# Patient Record
(demographics unavailable — no encounter records)

---

## 2024-11-18 NOTE — DISCUSSION/SUMMARY
[FreeTextEntry1] : Discussed management options for recurrent symptoms including getting a urine culture done to exclude urinary tract infection, consider use of organic coconut oil or vaginal estradiol cream if develops symptoms again.  She verbalized understanding. Advised her to follow-up with her GYN for routine GYN care. Follow-up in urogyn clinic as needed

## 2024-11-18 NOTE — HISTORY OF PRESENT ILLNESS
[FreeTextEntry1] : Patient is a 74-year-old multiparous breast cancer survivor was initially seen in July 2024 with concerns of intermittent pelvic pressure for past 7 months.  During that visit, on exam, she was noted to have evidence of atrophic vaginitis, negative cough stress test and normal postvoid residual. Potential contributing factors include chronic constipation and straining.  Patient was offered a trial of vaginal estradiol cream.  Urine culture from that visit returned negative.  Patient presents today for a scheduled follow-up.  She states that her initial symptoms of pelvic pressure etc. was resolved few weeks after the visit in July.  She did not use vaginal estradiol cream.  She did not need reports organic coconut oil either.  At this time, she reports feeling well in terms of her urogyn symptoms.

## 2025-03-18 NOTE — PHYSICAL EXAM
[Well Developed] : well developed [Well Nourished] : well nourished [No Acute Distress] : no acute distress [Normal Conjunctiva] : normal conjunctiva [Normal Venous Pressure] : normal venous pressure [Normal S1, S2] : normal S1, S2 [No Murmur] : no murmur [No Rub] : no rub [Clear Lung Fields] : clear lung fields [Good Air Entry] : good air entry [No Respiratory Distress] : no respiratory distress  [Soft] : abdomen soft [Non Tender] : non-tender [Normal Gait] : normal gait [No Edema] : no edema [No Rash] : no rash [Moves all extremities] : moves all extremities [No Focal Deficits] : no focal deficits [Alert and Oriented] : alert and oriented [Normal memory] : normal memory

## 2025-03-19 NOTE — HISTORY OF PRESENT ILLNESS
[FreeTextEntry1] : 74 year old female with symptomatic paroxysmal atrial fibrillation in the setting of hypertension with a CHADSVASC score of 3. Has normal LV function with EF of 71 %, mild LA dilatation and a negative pharmacological nuclear stress test. Unable to tolerate multaq due to gastrointestinal side effects.  Pathophysiology of afib and progression of disease was discussed.  On 11/14/2022 she underwent electrophysiology testing and ablation (Successful pulmonary vein antral isolation, Successful caval tricuspid isthmus ablation, Successful intracarinal line).   No palpitations. No chest pain. NO shortness of breath. No lightheadedness dizziness.  She is active but has no specific exercise regimen.

## 2025-03-19 NOTE — DISCUSSION/SUMMARY
[FreeTextEntry1] : 74 year old female with symptomatic paroxysmal atrial fibrillation in the setting of hypertension with a CHADSVASC score of 4 (now 75). Has normal LV function with EF of 71 %, mild LA dilatation and a negative pharmacological nuclear stress test. Unable to tolerate multaq due to gastrointestinal side effects.  Pathophysiology of afib and progression of disease was discussed.  On 11/14/2022 she underwent electrophysiology testing and ablation (Successful pulmonary vein antral isolation, Successful caval tricuspid isthmus ablation, Successful intracarinal line).   Clinically she is doing well post ablation.  However her smart watch does believe that she has an AF burden of 2%.  The ILR disagrees with this, ie has no AF. I counseled her the irregularity from APC s can effect the watches specificity.  While one may consider mapping and ablating the focus, she is clinically doing well and NOT having AF.  I suggested that we observe for now. Thia may be difficult given that the ILR is EOS. I believe that the burden as expressed by the watch will be a barometer for atrial ectopy with the idea that if this increases we can consider further monitoring, either in the form of a new ILR or external monitor.  We will contineue oral AC based on her elevated [id number].

## 2025-03-19 NOTE — CARDIOLOGY SUMMARY
[de-identified] : Today : SR with APCs x 2 [de-identified] : ILR with no AF since ablation (last episode 11/10/2022) [de-identified] : 8/2/21 Pharmacological nuclear stress test :\par  Negative for ischemia [de-identified] : 7/2021 \par  Normal LV function, EF 71 %, Mild left atrial dilatation\par   [de-identified] : 11/14/2022\par  Successful pulmonary vein antral isolation \par  Successful caval tricuspid isthmus ablation\par  Successful intracarinal line

## 2025-03-19 NOTE — CARDIOLOGY SUMMARY
[de-identified] : Today : SR with APCs x 2 [de-identified] : ILR with no AF since ablation (last episode 11/10/2022) [de-identified] : 8/2/21 Pharmacological nuclear stress test :\par  Negative for ischemia [de-identified] : 7/2021 \par  Normal LV function, EF 71 %, Mild left atrial dilatation\par   [de-identified] : 11/14/2022\par  Successful pulmonary vein antral isolation \par  Successful caval tricuspid isthmus ablation\par  Successful intracarinal line

## 2025-03-19 NOTE — DISCUSSION/SUMMARY
[FreeTextEntry1] : 74 year old female with symptomatic paroxysmal atrial fibrillation in the setting of hypertension with a CHADSVASC score of 4 (now 75). Has normal LV function with EF of 71 %, mild LA dilatation and a negative pharmacological nuclear stress test. Unable to tolerate multaq due to gastrointestinal side effects.  Pathophysiology of afib and progression of disease was discussed.  On 11/14/2022 she underwent electrophysiology testing and ablation (Successful pulmonary vein antral isolation, Successful caval tricuspid isthmus ablation, Successful intracarinal line).   Clinically she is doing well post ablation.  However her smart watch does believe that she has an AF burden of 2%.  The ILR disagrees with this, ie has no AF. I counseled her the irregularity from APC s can effect the watches specificity.  While one may consider mapping and ablating the focus, she is clinically doing well and NOT having AF.  I suggested that we observe for now. Thia may be difficult given that the ILR is EOS. I believe that the burden as expressed by the watch will be a barometer for atrial ectopy with the idea that if this increases we can consider further monitoring, either in the form of a new ILR or external monitor.  We will contineue oral AC based on her elevated [id number].